# Patient Record
Sex: MALE | Race: BLACK OR AFRICAN AMERICAN | Employment: FULL TIME | URBAN - METROPOLITAN AREA
[De-identification: names, ages, dates, MRNs, and addresses within clinical notes are randomized per-mention and may not be internally consistent; named-entity substitution may affect disease eponyms.]

---

## 2023-03-17 ENCOUNTER — APPOINTMENT (OUTPATIENT)
Dept: GENERAL RADIOLOGY | Age: 48
End: 2023-03-17
Payer: COMMERCIAL

## 2023-03-17 ENCOUNTER — HOSPITAL ENCOUNTER (EMERGENCY)
Age: 48
Discharge: HOME | End: 2023-03-17
Payer: COMMERCIAL

## 2023-03-17 ENCOUNTER — APPOINTMENT (OUTPATIENT)
Dept: CT IMAGING | Age: 48
End: 2023-03-17
Payer: COMMERCIAL

## 2023-03-17 VITALS
OXYGEN SATURATION: 96 % | BODY MASS INDEX: 42.21 KG/M2 | RESPIRATION RATE: 16 BRPM | HEART RATE: 88 BPM | TEMPERATURE: 97.5 F | DIASTOLIC BLOOD PRESSURE: 94 MMHG | SYSTOLIC BLOOD PRESSURE: 154 MMHG | WEIGHT: 285 LBS | HEIGHT: 69 IN

## 2023-03-17 DIAGNOSIS — M54.50 ACUTE MIDLINE LOW BACK PAIN WITHOUT SCIATICA: ICD-10-CM
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
## 2023-03-17 DIAGNOSIS — M25.559 HIP PAIN: ICD-10-CM

## 2023-03-17 DIAGNOSIS — R07.81 RIB PAIN ON RIGHT SIDE: ICD-10-CM

## 2023-03-17 DIAGNOSIS — M54.6 PAIN IN THORACIC SPINE: ICD-10-CM

## 2023-03-17 DIAGNOSIS — M25.561 ACUTE PAIN OF RIGHT KNEE: Primary | ICD-10-CM

## 2023-03-17 PROCEDURE — 72125 CT NECK SPINE W/O DYE: CPT

## 2023-03-17 PROCEDURE — 76376 3D RENDER W/INTRP POSTPROCES: CPT

## 2023-03-17 PROCEDURE — 70450 CT HEAD/BRAIN W/O DYE: CPT

## 2023-03-17 PROCEDURE — 99284 EMERGENCY DEPT VISIT MOD MDM: CPT

## 2023-03-17 PROCEDURE — 71250 CT THORAX DX C-: CPT

## 2023-03-17 PROCEDURE — 6370000000 HC RX 637 (ALT 250 FOR IP): Performed by: NURSE PRACTITIONER

## 2023-03-17 PROCEDURE — 73564 X-RAY EXAM KNEE 4 OR MORE: CPT

## 2023-03-17 RX ORDER — ACETAMINOPHEN 325 MG/1
650 TABLET ORAL ONCE
Status: COMPLETED | OUTPATIENT
Start: 2023-03-17 | End: 2023-03-17

## 2023-03-17 RX ADMIN — ACETAMINOPHEN 650 MG: 325 TABLET ORAL at 18:10

## 2023-03-17 ASSESSMENT — PAIN SCALES - GENERAL: PAINLEVEL_OUTOF10: 9

## 2023-03-17 ASSESSMENT — PAIN DESCRIPTION - LOCATION: LOCATION: BACK

## 2023-03-17 ASSESSMENT — PAIN DESCRIPTION - ORIENTATION: ORIENTATION: LOWER

## 2023-03-17 NOTE — DISCHARGE INSTR - COC
Continuity of Care Form    Patient Name: Taryn Jones   :  1975  MRN:  [id number]    Admit date:  3/17/2023  Discharge date:  ***    Code Status Order: No Order   Advance Directives:     Admitting Physician:  No admitting provider for patient encounter. PCP: No primary care provider on file. Discharging Nurse: [facility] Unit/Room#: ED05/ED-05  Discharging Unit Phone Number: ***    Emergency Contact:   No emergency contact information on file. Past Surgical History:  History reviewed. No pertinent surgical history. Immunization History: There is no immunization history on file for this patient. Active Problems: There is no problem list on file for this patient. Isolation/Infection:   Isolation            No Isolation          Patient Infection Status       None to display            Nurse Assessment:  Last Vital Signs: BP (!) 154/94   Pulse 88   Temp 97.5 °F (36.4 °C) (Oral)   Resp 16   SpO2 96%     Last documented pain score (0-10 scale):    Last Weight:   Wt Readings from Last 1 Encounters:   No data found for Wt     Mental Status:  {IP PT MENTAL STATUS:}    IV Access:  { JUDY IV ACCESS:046436588}    Nursing Mobility/ADLs:  Walking   {CHP DME ZNPX:468369316}  Transfer  {CHP DME AJWV:532021585}  Bathing  {CHP DME FDPF:224066960}  Dressing  {CHP DME ENKJ:425602098}  Toileting  {CHP DME WCBL:511701030}  Feeding  {P DME XTLW:419221196}  Med Admin  {P DME XKMF:305729977}  Med Delivery   {[facility] MED Delivery:642087729}    Wound Care Documentation and Therapy:        Elimination:  Continence: Bowel: {YES / HU:84496}  Bladder: {YES / XQ:52062}  Urinary Catheter: {Urinary Catheter:061376480}   Colostomy/Ileostomy/Ileal Conduit: {YES / KT:41002}       Date of Last BM: ***  No intake or output data in the 24 hours ending 23 1750  No intake/output data recorded.     Safety Concerns:     [address] JUDY Safety Concerns:352888721}    Impairments/Disabilities:      [address] JUDY Impairments/Disabilities:545658285}    Nutrition Therapy:  Current Nutrition Therapy:   [address] JUDY Diet List:558787502}    Routes of Feeding: {CHP DME Other Feedings:129886421}  Liquids: {Slp liquid thickness:80532}  Daily Fluid Restriction: {CHP DME Yes amt example:472331300}  Last Modified Barium Swallow with Video (Video Swallowing Test): {Done Not Done AWWT:173656152}    Treatments at the Time of Hospital Discharge:   Respiratory Treatments: ***  Oxygen Therapy:  {Therapy; copd oxygen:73233}  Ventilator:    {[facility] Vent RFGY:624699264}    Rehab Therapies: {THERAPEUTIC INTERVENTION:9224495507}  Weight Bearing Status/Restrictions: {[facility] Weight Bearin}  Other Medical Equipment (for information only, NOT a DME order):  {EQUIPMENT:094314523}  Other Treatments: ***    Patient's personal belongings (please select all that are sent with patient):  {[facility] DME Belongings:107201993}    RN SIGNATURE:  {Esignature:651746015}    CASE MANAGEMENT/SOCIAL WORK SECTION    Inpatient Status Date: ***    Readmission Risk Assessment Score:  Readmission Risk              Risk of Unplanned Readmission:  0           Discharging to Facility/ Agency   Name:   Address:  Phone:  Fax:    Dialysis Facility (if applicable)   Name:  Address:  Dialysis Schedule:  Phone:  Fax:    / signature: {Esignature:623193122}    PHYSICIAN SECTION    Prognosis: {Prognosis:1299668471}    Condition at Discharge: [address] Patient Condition:346180627}    Rehab Potential (if transferring to Rehab): {Prognosis:1998699463}    Recommended Labs or Other Treatments After Discharge: ***    Physician Certification: I certify the above information and transfer of Caden Davies  is necessary for the continuing treatment of the diagnosis listed and that he requires {Admit to Appropriate Level of Care:19364} for {GREATER/LESS:934343340} 30 days.      Update Admission H&P: {CHP DME Changes in GQXSN:313874345}    PHYSICIAN SIGNATURE: {[facility]:472186263}

## 2023-03-17 NOTE — ED PROVIDER NOTES
EMERGENCY DEPARTMENT ENCOUNTER      PCP: No primary care provider on file. CHIEF COMPLAINT    Chief Complaint   Patient presents with    Fall     Right shoulder and tailbone pain         HPI    Cris Jurado is a 56 y.o. male who presents with complaints of right knee pain, right hip pain, back pain, right rib pain and neck pain. The patient states he was walking into [facility] and the bottom of his shoes were slippery from the wet ground and his feet came out from under him and he fell landing on his right side. He states he developed immediate right knee pain, hip pain, rib pain, back pain, and neck pain. Unknown if he lost consciousness. Patient is extremely tired on exam, he will wake to verbal stimuli, however immediately falls back to sleep. He denies headache, chest pain, abdominal pain, nausea, vomiting, or other complaints. REVIEW OF SYSTEMS    Constitutional:  Denies fever, chills, weight loss or weakness   HENT:  Denies sore throat or ear pain   Cardiovascular:  Denies chest pain, palpitations   Respiratory:  Denies cough or shortness of breath    GI:  Denies abdominal pain, nausea, vomiting, or diarrhea  :  Denies any urinary symptoms   Musculoskeletal:  see HPI. Skin:  Denies rash  Neurologic:  See HPI  Endocrine:  Denies polyuria or polydypsia   Lymphatic:  Denies swollen glands     All other review of systems are negative  See HPI and nursing notes for additional information     PAST MEDICAL AND SURGICAL HISTORY    History reviewed. No pertinent past medical history. History reviewed. No pertinent surgical history.     CURRENT MEDICATIONS        ALLERGIES    Allergies   Allergen Reactions    Other      Apples,corn       SOCIAL AND FAMILY HISTORY    Social History     Socioeconomic History    Marital status: Single     Spouse name: None    Number of children: None    Years of education: None    Highest education level: None   Tobacco Use    Smoking status: Never    Smokeless tobacco: Never   Substance and Sexual Activity    Alcohol use: Yes    Drug use: Never     History reviewed. No pertinent family history. PHYSICAL EXAM    VITAL SIGNS: BP (!) 154/94   Pulse 88   Temp 97.5 °F (36.4 °C) (Oral)   Resp 16   Ht 5' 9\" (1.753 m)   Wt 285 lb (129.3 kg)   SpO2 96%   BMI 42.09 kg/m²    Constitutional:  Well developed, Well nourished. No distress  HENT:  Normocephalic, Atraumatic, PERRL. EOMI. Sclera clear. Conjunctiva normal, No discharge. Neck/Lymphatics: supple, no JVD, no swollen nodes  Cardiovascular:   RRR,  no murmurs/rubs/gallops. No JVD  Respiratory:  Nonlabored breathing. Normal breath sounds, No wheezing. Tenderness with palpation of right ribs laterally, no crepitus or subcutaneous air. Abdomen: Bowel sounds normal, Soft, No tenderness, no masses. Musculoskeletal:    This with palpation of cervical, thoracic, lumbar spine midline and bilateral paraspinous muscles, no step-offs or obvious deformities. He also has tenderness with palpation and range of motion of right hip and knee. No obvious deformities or open wounds. Able to stress ligaments due to tenderness. No tenderness with palpation of right ankle or foot. Neurovascularly intact distal to area of concern with distal pulses 2+. Moves all other extremities well without signs of trauma. Chief complaint states shoulder pain, however the patient denies this and has full range of motion in the right shoulder. Integument:  Warm, Dry  Neurologic: Alert & oriented , No focal deficits noted. Cranial nerves II through XII grossly intact. Normal gross motor coordination & motor strength bilateral upper and lower extremities  Sensation intact.   Psychiatric:  Affect normal, Mood normal.         RADIOLOGY      Non-plain film images such as CT, Ultrasound and MRI are read by the radiologist. OLYA Barraza CNP have directly visualized the radiologic plain film image(s) with the below findings read by radiologist and agree with interpretation:  CT THORACIC RECONSTRUCTION WO POST PROCESS   Final Result   1. No acute abnormality in the chest, abdomen, or pelvis. 2. No acute thoracic or lumbar spine abnormality. CT LUMBAR RECONSTRUCTION WO POST PROCESS   Final Result   1. No acute abnormality in the chest, abdomen, or pelvis. 2. No acute thoracic or lumbar spine abnormality. CT CHEST ABDOMEN PELVIS WO CONTRAST Additional Contrast? None   Final Result   1. No acute abnormality in the chest, abdomen, or pelvis. 2. No acute thoracic or lumbar spine abnormality. CT CERVICAL SPINE WO CONTRAST   Final Result   No acute abnormality of the cervical spine. CT HEAD WO CONTRAST   Final Result   No acute intracranial abnormality. XR KNEE RIGHT (MIN 4 VIEWS)   Final Result   1. No acute fracture or dislocation. 2. Mild tricompartmental degenerative changes of the knee. 3. Ossification at the proximal aspect of the fibula which could reflect an   old avulsion fracture. CC/HPI Summary, DDx, ED Course, and Reassessment:   71-year-old male presents emergency department with complaints of right knee pain, hip pain, back pain,rib pain and neck pain s/p a non-syncopal fall. CT of the head without contrast, cervical spine without contrast, lumbar spine without contrast, thoracic spine without contrast, and CT of the chest abdomen and pelvis obtained. X-ray of the right knee obtained. He was medicated with Tylenol p.o.     History from : Patient    Limitations to history : None    Patient was given the following medications:  Medications   acetaminophen (TYLENOL) tablet 650 mg (650 mg Oral Given 3/17/23 1810)         Chronic conditions affecting care: none    Discussion with Other Profesionals : None    Social Determinants : None    Records Reviewed : None    Disposition Considerations (tests considered but not done, Shared Decision Making, Pt Expectation of Test or Tx.):   Appropriate for outpatient management   CT of the chest abdomen and pelvis and CT of the thoracic and lumbar spine showed no acute findings. CT of the head without contrast showed no acute findings. EXTR of the right knee showed no acute fracture or dislocation, did show arthritis. I was unable to stress his ligaments due to pain. He was placed in a straight leg knee immobilizer. He was instructed to alternate Tylenol and ibuprofen as directed for pain, follow-up with a primary care provider within a week and have them recheck his blood pressure as it was elevated during today's stay, follow-up with orthopedics within a week, and return here with worsening symptoms. He verbalized understanding and agrees with plan of care. I am the Primary Clinician of Record. Patient agrees to return emergency department if symptoms worsen or any new symptoms develop. Vital signs and nursing notes reviewed during ED course. I have low suspicion for intracranial hemorrhage, skull fracture, cervical spine fracture dislocation, lumbar thoracic spine fracture dislocation, rib fractures, pneumothorax, hemothorax, abdominal trauma, knee fracture dislocation, compartment syndrome, or other emergent condition. Clinical  IMPRESSION    1. Acute pain of right knee    2. Hip pain    3. Acute midline low back pain without sciatica    4. Pain in thoracic spine    5. Rib pain on right side    6. Fall, initial encounter              Comment: Please note this report has been produced using speech recognition software and may contain errors related to that system including errors in grammar, punctuation, and spelling, as well as words and phrases that may be inappropriate. If there are any questions or concerns please feel free to contact the dictating provider for clarification.       Renee Vaughan, OLYA - CNP  03/18/23 0157

## 2023-03-17 NOTE — Clinical Note
Melvi Del Rio was seen and treated in our emergency department on 3/17/2023. He may return to work on 03/20/2023. If you have any questions or concerns, please don't hesitate to call.       OLYA Lanza - CNP

## 2023-03-17 NOTE — ED NOTES
Pt returned to room from radiology dept. Snoring respirations noted, respirations even and unlabored. No distress noted.      Daniella Mares RN  03/17/23 5822

## 2023-03-18 NOTE — DISCHARGE INSTRUCTIONS
Take Tylenol and ibuprofen as directed for pain. Elevate extremity. Ice area several times daily least 20 minutes, make sure there is a cloth between ice and skin. Wear straight leg knee immobilizer until further instructions from your primary care provider or orthopedics. Call Monday to schedule an appointment. Your primary care provider recheck your blood pressure was elevated during today's stay. Return to the emergency department with worsening symptoms.